# Patient Record
Sex: FEMALE | URBAN - METROPOLITAN AREA
[De-identification: names, ages, dates, MRNs, and addresses within clinical notes are randomized per-mention and may not be internally consistent; named-entity substitution may affect disease eponyms.]

---

## 2021-04-21 PROBLEM — Z00.00 ENCOUNTER FOR PREVENTIVE HEALTH EXAMINATION: Status: ACTIVE | Noted: 2021-04-21

## 2021-04-22 ENCOUNTER — OUTPATIENT (OUTPATIENT)
Dept: OUTPATIENT SERVICES | Facility: HOSPITAL | Age: 41
LOS: 1 days | End: 2021-04-22
Payer: COMMERCIAL

## 2021-04-22 ENCOUNTER — RESULT REVIEW (OUTPATIENT)
Age: 41
End: 2021-04-22

## 2021-04-22 ENCOUNTER — APPOINTMENT (OUTPATIENT)
Dept: ORTHOPEDIC SURGERY | Facility: CLINIC | Age: 41
End: 2021-04-22
Payer: COMMERCIAL

## 2021-04-22 VITALS
HEART RATE: 75 BPM | OXYGEN SATURATION: 98 % | SYSTOLIC BLOOD PRESSURE: 110 MMHG | WEIGHT: 199 LBS | BODY MASS INDEX: 35.26 KG/M2 | DIASTOLIC BLOOD PRESSURE: 60 MMHG | HEIGHT: 63 IN

## 2021-04-22 DIAGNOSIS — M22.42 CHONDROMALACIA PATELLAE, RIGHT KNEE: ICD-10-CM

## 2021-04-22 DIAGNOSIS — Z78.9 OTHER SPECIFIED HEALTH STATUS: ICD-10-CM

## 2021-04-22 DIAGNOSIS — Z82.69 FAMILY HISTORY OF OTHER DISEASES OF THE MUSCULOSKELETAL SYSTEM AND CONNECTIVE TISSUE: ICD-10-CM

## 2021-04-22 DIAGNOSIS — M22.41 CHONDROMALACIA PATELLAE, RIGHT KNEE: ICD-10-CM

## 2021-04-22 DIAGNOSIS — M23.303 OTHER MENISCUS DERANGEMENTS, UNSPECIFIED MEDIAL MENISCUS, RIGHT KNEE: ICD-10-CM

## 2021-04-22 DIAGNOSIS — Z72.3 LACK OF PHYSICAL EXERCISE: ICD-10-CM

## 2021-04-22 PROCEDURE — 99072 ADDL SUPL MATRL&STAF TM PHE: CPT

## 2021-04-22 PROCEDURE — 72170 X-RAY EXAM OF PELVIS: CPT

## 2021-04-22 PROCEDURE — 72170 X-RAY EXAM OF PELVIS: CPT | Mod: 26

## 2021-04-22 PROCEDURE — 73564 X-RAY EXAM KNEE 4 OR MORE: CPT | Mod: 26,LT,RT

## 2021-04-22 PROCEDURE — 73564 X-RAY EXAM KNEE 4 OR MORE: CPT

## 2021-04-22 PROCEDURE — 20610 DRAIN/INJ JOINT/BURSA W/O US: CPT | Mod: RT

## 2021-04-22 PROCEDURE — 99204 OFFICE O/P NEW MOD 45 MIN: CPT | Mod: 25

## 2021-04-22 RX ORDER — MELOXICAM 15 MG/1
15 TABLET ORAL DAILY
Qty: 30 | Refills: 2 | Status: ACTIVE | COMMUNITY
Start: 2021-04-22 | End: 1900-01-01

## 2021-04-23 NOTE — DISCUSSION/SUMMARY
[de-identified] : 39y/o female with bilateral chondromalacia patella and right medial meniscal tear\par - Start PT\par - HEP encouraged\par - Tylenol + meloxicam as needed\par - CSI administered to right knee today\par - Follow up after PT, no new XRs needed. R knee MRI if not sufficiently improved.

## 2021-04-23 NOTE — HISTORY OF PRESENT ILLNESS
[___ mths] : [unfilled] month(s) ago [9] : a current pain level of 9/10 [Constant] : ~He/She~ states the symptoms seem to be constant [Bending] : worsened by bending [Walking] : worsened by walking [de-identified] : 39y/o female presenting for evaluation of R > L knee pain. Symptoms arose after a mechanical trip and fall on 1/31/21. Localizes the pain to anterior and medial aspects of the right knee, anterior aspect of the left knee. Any activity is painful, particularly bending/squatting/stairs. Has not attempted any treatment aside from Tylenol, which helps very little. Current exercise tolerance about 6 blocks. No PMH aside from early menopause. Denies mechanical locking/buckling in either knee. [de-identified] : describes throbbing pain.

## 2021-04-23 NOTE — PHYSICAL EXAM
[de-identified] : General appearance: well nourished and hydrated, pleasant, alert and oriented x 3, cooperative.  \par HEENT: normocephalic, EOM intact, wearing mask, external auditory canal clear.  \par Cardiovascular: no lower leg edema, no varicosities, dorsalis pedis pulses palpable and symmetric.  \par Lymphatics: no palpable lymphadenopathy, no lymphedema.  \par Neurologic: sensation is normal, no muscle weakness in upper or lower extremities, patella tendon reflexes present and symmetric.  \par Dermatologic: skin moist, warm, no rash.  \par Spine: cervical spine with normal lordosis and painless range of motion, thoracic spine with normal kyphosis and painless range of motion, lumbosacral spine with normal lordosis and painless range of motion.  No tenderness to palpation along midline spine and paraspinal musculature.  Sacroiliac joints nontender bilaterally. Negative SLR and crossed SLR tests bilaterally.\par Gait: antalgic right.\par \par Left knee:\par - Inspection: negative swelling, ecchymosis, and erythema.  \par - Wounds: none.  \par - Alignment: normal.  \par - Palpation: peripatellar tenderness on palpation.  \par - ROM active: 0-140, anterior pain with deep flexion.\par - Ligamentous laxity: negative Lachman, negative ant. drawer test, negative post. drawer test, negative pivot shift test, stable to varus stress, stable to valgus stress.  \par - Meniscal Test: negative Liyah, negative René.  \par - Popliteal angle: 45 degrees\par - Muscle Test: 5/5 quad strength.  \par \par Right knee:\par - Inspection: negative swelling, ecchymosis, and erythema.  \par - Wounds: none.  \par - Alignment: normal.  \par - Palpation: peripatellar and medial joint line tenderness on palpation.  \par - ROM active: 0-140, anteromedial pain on extreme flexion.\par - Ligamentous laxity: negative Lachman, negative ant. drawer test, negative post. drawer test, negative pivot shift test, stable to varus stress, stable to valgus stress.  \par - Meniscal Test: painful Liyah and René.  \par - Popliteal angle: 45 degrees\par - Muscle Test: 5/5 quad strength. [de-identified] : AP pelvis and 4 views of the bilateral knees (weightbearing AP, weightbearing Mckeon, weightbearing lateral, and Sunrise) were obtained today, interpreted by me, and reviewed with the patient.\par \par Bilateral hips demonstrate normal alignment without arthritis (Tonnis 0). 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 There is no fracture or prior fracture deformity. There is no radiographic evidence of osteonecrosis.\par \par Bilateral sacroiliac joints appear normal without arthrosis.\par \par The left knee demonstrates normal alignment in the coronal and sagittal planes. There is no arthritis in the medial or lateral compartments, Kellgren-Nelson 0. The patella sits at appropriate height and displays mild lateral subluxation.\par \par The right knee demonstrates normal alignment in the coronal and sagittal planes. There is no arthritis in the medial or lateral compartments, Kellgren-Nelson 0. The patella sits at appropriate height and displays mild lateral subluxation.\par

## 2021-06-03 ENCOUNTER — APPOINTMENT (OUTPATIENT)
Dept: ORTHOPEDIC SURGERY | Facility: CLINIC | Age: 41
End: 2021-06-03

## 2022-03-18 ENCOUNTER — APPOINTMENT (OUTPATIENT)
Dept: ORTHOPEDIC SURGERY | Facility: CLINIC | Age: 42
End: 2022-03-18

## 2022-04-01 ENCOUNTER — APPOINTMENT (OUTPATIENT)
Dept: ORTHOPEDIC SURGERY | Facility: CLINIC | Age: 42
End: 2022-04-01